# Patient Record
Sex: FEMALE | Race: WHITE | NOT HISPANIC OR LATINO | Employment: FULL TIME | URBAN - METROPOLITAN AREA
[De-identification: names, ages, dates, MRNs, and addresses within clinical notes are randomized per-mention and may not be internally consistent; named-entity substitution may affect disease eponyms.]

---

## 2017-12-07 ENCOUNTER — TRANSCRIBE ORDERS (OUTPATIENT)
Dept: MAMMOGRAPHY | Facility: HOSPITAL | Age: 37
End: 2017-12-07

## 2017-12-07 DIAGNOSIS — N63.23 MASS OF LOWER OUTER QUADRANT OF LEFT BREAST: Primary | ICD-10-CM

## 2017-12-13 ENCOUNTER — TELEPHONE (OUTPATIENT)
Dept: GENETICS | Facility: HOSPITAL | Age: 37
End: 2017-12-13

## 2017-12-21 ENCOUNTER — LAB (OUTPATIENT)
Dept: LAB | Facility: HOSPITAL | Age: 37
End: 2017-12-21

## 2017-12-21 ENCOUNTER — CLINICAL SUPPORT (OUTPATIENT)
Dept: GENETICS | Facility: HOSPITAL | Age: 37
End: 2017-12-21

## 2017-12-21 DIAGNOSIS — Z80.3 FAMILY HISTORY OF BREAST CANCER: Primary | ICD-10-CM

## 2017-12-21 DIAGNOSIS — Z80.42 FAMILY HISTORY OF PROSTATE CANCER: ICD-10-CM

## 2017-12-21 DIAGNOSIS — Z13.79 GENETIC TESTING: Primary | ICD-10-CM

## 2017-12-21 DIAGNOSIS — Z80.1 FAMILY HISTORY OF LUNG CANCER: ICD-10-CM

## 2017-12-27 NOTE — PROGRESS NOTES
Brielle Alejandro is a 37-year old female who was seen for genetic counseling due to a family history of breast cancer.  Ms. Alejandro has no personal history of cancer.  Ms. Alejandro was 9 years old at menarche and had her first child at age 28. Her uterus and ovaries remain intact. Ms. Alejandro recently had a breast ultrasound and mammogram due to a left breast mass, and is scheduled for a breast biopsy next week. Ms. Alejandro was interested in discussing her risk for a hereditary cancer syndrome. She opted to pursue comprehensive testing via the BreastNext panel ordered through [employer] which includes 17 genes that are known to increase the risk of breast cancer; genes included on the panel are WILLIS, BARD1, BRCA1, BRCA2, BRIP1, CDH1, CHEK2, MRE11A, MUTYH, NBN, NF1, PALB2, PTEN, RAD50, RAD51C, RAD51D, and TP53. Results are expected in 2-3 weeks.     PERTINENT FAMILY HISTORY: (See attached pedigree)   Mother:  Breast cancer, 35  Mat. Grandmother: Breast cancer, 60s  Mat. Grandfather: Prostate cancer, 77  Mat. Uncle:  Lung cancer  Mat. Great Aunt: Breast cancer    We do not have medical records regarding the cancer diagnoses in Ms. Alejandro’s family.     RISK ASSESSMENT:  Ms. Alejandro’s family history of breast cancer raises the question of a hereditary breast cancer syndrome. She meets NCCN guidelines criteria for BRCA1/2 testing based on her family history. In cases where an affected relative is not available for testing or not willing to pursue testing, it is appropriate to offer testing to an unaffected individual. If genetic testing is negative, management should be guided by a family history risk assessment. These risk assessments are based on the family history information provided at the time of the appointment.  The assessments could change in the future should new information be obtained.    GENETIC COUNSELING (30 minutes): We reviewed the family history information in detail.  Cases of cancer follow three general  patterns: sporadic, familial, and hereditary.  While most breast cancer is sporadic, some cases appear to occur in family clusters.  These cases are said to be familial and account for 10-20% of breast cancer cases.  Familial cases may be due to a combination of shared genes and environmental factors among family members.  In even fewer families, the cancer is said to be inherited, and the genes responsible for the cancer are known.      Family histories typical of hereditary cancer syndromes usually include multiple first- and second-degree relatives diagnosed with cancer types that define a syndrome.  These cases tend to be diagnosed at younger-than-expected ages and can be bilateral or multifocal.  The cancer in these families follows an autosomal dominant inheritance pattern, which indicates the likely presence of a mutation in a cancer susceptibility gene.  Children and siblings of an individual believed to carry this mutation have a 50% chance of inheriting that mutation, thereby inheriting the increased risk to develop cancer.  These mutations can be passed down from the maternal or the paternal lineage.    Based on Ms. Alejandro’s family history, we discussed hereditary breast and ovarian cancer syndrome.  A significant proportion of hereditary breast and ovarian cancer can be attributed to mutations in the BRCA1 and BRCA2 genes.  Mutations in these genes confer an increased risk for breast cancer, ovarian cancer, male breast cancer, prostate cancer, and pancreatic cancer.  Women with a BRCA1 or BRCA2 mutation have up to an 87% lifetime risk of breast cancer and up to a 44% risk of ovarian cancer.      There are other, more rare, hereditary cancer syndromes. Some of these conditions have well defined cancer risks and established management guidelines.  Other genes that can be tested for have been more recently described, and there may be less data regarding the risks and therefore may not have established  management guidelines.  We discussed these limitations at length.  Based on Ms. Alejandro’s family history and her desire to get the most information regarding her personal risks and potential risks for family members, she opted to pursue testing through a panel evaluating several other genes known to increase the risk for breast cancer.    Genetic Testing:  The risks, benefits and limitations of genetic testing and implications for clinical management following testing were reviewed.  DNA test results can influence decisions regarding screening, prevention and surgical management.  Genetic testing can have significant psychological implications for both individuals and families.  Also discussed was the possibility of employment and insurance discrimination based on genetic test results and the laws in place to prevent this.    We discussed panel testing, which would involve testing for BRCA1/2 as well as several other genes (other genes include WILLIS, BARD1, BRIP1, CDH1, CHEK2, NBN, NF1, MRE11A, MUTYH, PALB2, PTEN, RAD50, RAD51C, RAD51D, TP53). The benefits and limitations of genetic testing were discussed and Ms. Alejandro decided to pursue testing via the panel. The implications of a positive or negative test result were discussed. We discussed the importance of testing on an affected relative. In general, a negative genetic test result is most informative if a mutation has first been established in an affected member of the family.  In cases where an affected individual is not available or interested in testing, it is appropriate to offer testing to an unaffected individual. We discussed the possibility that, in some cases, genetic test results may be informative or may be ambiguous due to the identification of a genetic variant. These variants may or may not be associated with an increased cancer risk.  With multigene panel testing, it is not uncommon for a variant of uncertain significance (VUS) to be identified.  If a  VUS is identified, testing family members is typically not recommended and screening recommendations are made based on the family history.  The laboratories that perform genetic testing work to reclassify the VUS and send out an amended report if and when a VUS is reclassified.  The majority of variant findings are ultimately reclassified to a negative result. We also discussed that some of the genes on this particular panel have not been well studied yet and there may not be clear implications or guidelines for some of the genes included on this comprehensive panel.    PLAN: Ms. Alejandro will be contacted by telephone to discuss the results, which should be available in 2-3 weeks. In the meantime, Ms. Alejandro is welcome to contact us at [phone] with any questions she may have.      Vanessa Steiner MS  Genetic Counselor

## 2017-12-28 ENCOUNTER — TRANSCRIBE ORDERS (OUTPATIENT)
Dept: MAMMOGRAPHY | Facility: HOSPITAL | Age: 37
End: 2017-12-28

## 2017-12-28 ENCOUNTER — HOSPITAL ENCOUNTER (OUTPATIENT)
Dept: MAMMOGRAPHY | Facility: HOSPITAL | Age: 37
Discharge: HOME | End: 2017-12-28
Attending: SURGERY | Admitting: SURGERY

## 2017-12-28 ENCOUNTER — HOSPITAL ENCOUNTER (OUTPATIENT)
Dept: ULTRASOUND IMAGING | Facility: HOSPITAL | Age: 37
Discharge: HOME | End: 2017-12-28

## 2017-12-28 DIAGNOSIS — R92.8 ABNORMAL MAMMOGRAM: Primary | ICD-10-CM

## 2017-12-28 DIAGNOSIS — N63.23 MASS OF LOWER OUTER QUADRANT OF LEFT BREAST: ICD-10-CM

## 2017-12-28 PROCEDURE — 76642 ULTRASOUND BREAST LIMITED: CPT | Performed by: RADIOLOGY

## 2017-12-28 PROCEDURE — 77062 BREAST TOMOSYNTHESIS BI: CPT | Performed by: RADIOLOGY

## 2017-12-28 PROCEDURE — G0279 TOMOSYNTHESIS, MAMMO: HCPCS

## 2017-12-28 PROCEDURE — G0204 DX MAMMO INCL CAD BI: HCPCS

## 2017-12-28 PROCEDURE — 77066 DX MAMMO INCL CAD BI: CPT | Performed by: RADIOLOGY

## 2017-12-28 PROCEDURE — 76642 ULTRASOUND BREAST LIMITED: CPT

## 2018-01-10 ENCOUNTER — DOCUMENTATION (OUTPATIENT)
Dept: GENETICS | Facility: HOSPITAL | Age: 38
End: 2018-01-10

## 2018-01-10 NOTE — PROGRESS NOTES
Brielle Alejandro is a 37-year old female who was seen for genetic counseling due to a family history of breast cancer.  Ms. Alejandro has no personal history of cancer.  Ms. Alejandro was 9 years old at menarche and had her first child at age 28. Her uterus and ovaries remain intact. Ms. Alejandro recently had a breast ultrasound and mammogram due to a left breast mass, and is scheduled for a breast biopsy next week. Ms. Alejandro was interested in discussing her risk for a hereditary cancer syndrome. She opted to pursue comprehensive testing via the BreastNext panel ordered through [employer] which includes 17 genes that are known to increase the risk of breast cancer; genes included on the panel are WILLIS, BARD1, BRCA1, BRCA2, BRIP1, CDH1, CHEK2, MRE11A, MUTYH, NBN, NF1, PALB2, PTEN, RAD50, RAD51C, RAD51D, and TP53. Genetic testing was negative by sequencing and rearrangement testing for deleterious mutations in the 17 genes included on the BreastNext panel (see attached results). These normal results were discussed with Ms. Alejandro by telephone on 1/10/18.    PERTINENT FAMILY HISTORY: (See attached pedigree)   Mother:  Breast cancer, 35  Mat. Grandmother: Breast cancer, 60s; Lung cancer  Mat. Grandfather: Prostate cancer, 77  Mat. Uncle:  Lung cancer  Mat. Great Aunt: Breast cancer    We do not have medical records regarding the cancer diagnoses in Ms. Alejandro’s family.     RISK ASSESSMENT:  Ms. Alejandro’s family history of breast cancer raises the question of a hereditary breast cancer syndrome. She meets NCCN guidelines criteria for BRCA1/2 testing based on her family history. In cases where an affected relative is not available for testing or not willing to pursue testing, it is appropriate to offer testing to an unaffected individual. If genetic testing is negative, management should be guided by a family history risk assessment.     At this time, Ms. Alejandro’s lifetime risk of developing breast cancer should be assessed using family  history risk assessment models.  Using these models, Ms. Alejandro’s breast cancer risk is estimated to be up to 28.9% (CHRISTIE).  A risk greater than 20% warrants consideration of increased screening. These risk assessments are based on the family history information provided at the time of the appointment.  The assessments could change in the future should new information be obtained.    GENETIC COUNSELING (30 minutes): We reviewed the family history information in detail.  Cases of cancer follow three general patterns: sporadic, familial, and hereditary.  While most breast cancer is sporadic, some cases appear to occur in family clusters.  These cases are said to be familial and account for 10-20% of breast cancer cases.  Familial cases may be due to a combination of shared genes and environmental factors among family members.  In even fewer families, the cancer is said to be inherited, and the genes responsible for the cancer are known.      Family histories typical of hereditary cancer syndromes usually include multiple first- and second-degree relatives diagnosed with cancer types that define a syndrome.  These cases tend to be diagnosed at younger-than-expected ages and can be bilateral or multifocal.  The cancer in these families follows an autosomal dominant inheritance pattern, which indicates the likely presence of a mutation in a cancer susceptibility gene.  Children and siblings of an individual believed to carry this mutation have a 50% chance of inheriting that mutation, thereby inheriting the increased risk to develop cancer.  These mutations can be passed down from the maternal or the paternal lineage.    Based on Ms. Alejandro’s family history, we discussed hereditary breast and ovarian cancer syndrome.  A significant proportion of hereditary breast and ovarian cancer can be attributed to mutations in the BRCA1 and BRCA2 genes.  Mutations in these genes confer an increased risk for breast cancer, ovarian  cancer, male breast cancer, prostate cancer, and pancreatic cancer.  Women with a BRCA1 or BRCA2 mutation have up to an 87% lifetime risk of breast cancer and up to a 44% risk of ovarian cancer.      There are other, more rare, hereditary cancer syndromes. Some of these conditions have well defined cancer risks and established management guidelines.  Other genes that can be tested for have been more recently described, and there may be less data regarding the risks and therefore may not have established management guidelines.  We discussed these limitations at length.  Based on Ms. Alejandro’s family history and her desire to get the most information regarding her personal risks and potential risks for family members, she opted to pursue testing through a panel evaluating several other genes known to increase the risk for breast cancer.    GENETIC TESTING:  The risks, benefits and limitations of genetic testing and implications for clinical management following testing were reviewed.  DNA test results can influence decisions regarding screening, prevention and surgical management.  Genetic testing can have significant psychological implications for both individuals and families.  Also discussed was the possibility of employment and insurance discrimination based on genetic test results and the laws in place to prevent this.    We discussed panel testing, which would involve testing for BRCA1/2 as well as several other genes (other genes include WILLIS, BARD1, BRIP1, CDH1, CHEK2, NBN, NF1, MRE11A, MUTYH, PALB2, PTEN, RAD50, RAD51C, RAD51D, TP53). The benefits and limitations of genetic testing were discussed and Ms. Alejandro decided to pursue testing via the panel. The implications of a positive or negative test result were discussed. We discussed the importance of testing on an affected relative. In general, a negative genetic test result is most informative if a mutation has first been established in an affected member of  the family.  In cases where an affected individual is not available or interested in testing, it is appropriate to offer testing to an unaffected individual. We discussed the possibility that, in some cases, genetic test results may be informative or may be ambiguous due to the identification of a genetic variant. These variants may or may not be associated with an increased cancer risk.  With multigene panel testing, it is not uncommon for a variant of uncertain significance (VUS) to be identified.  If a VUS is identified, testing family members is typically not recommended and screening recommendations are made based on the family history.  The laboratories that perform genetic testing work to reclassify the VUS and send out an amended report if and when a VUS is reclassified.  The majority of variant findings are ultimately reclassified to a negative result. We also discussed that some of the genes on this particular panel have not been well studied yet and there may not be clear implications or guidelines for some of the genes included on this comprehensive panel.    TEST RESULTS:  Genetic testing was negative for known deleterious mutations by sequencing and rearrangement testing for the genes on the BreastNext panel.   It is possible that the family history is due to a mutation that Ms. Alejandro did not inherit; therefore, genetic testing for affected family members is still available.  If a relative of Ms. Alejandro were to have testing and was found to carry a mutation in a gene included on this panel, Ms. Alejandro’s risk assessment would need to be updated.   This assessment is based on the information provided at the time of the consultation.    CANCER PREVENTION:  Despite the negative genetic test results, Ms. Alejandro’s lifetime risk for breast cancer is estimated to be above 20% based on her family history. Given Ms. Alejandro’s increased risk, options available to individuals with a high lifetime risk for breast cancer  were discussed, including increased surveillance and chemoprevention.    Increased surveillance, based on NCCN guidelines, would consist of semi-annual clinical breast exam and monthly self-breast exam starting by age 18 and annual mammography typically starting ten years prior to the youngest diagnosis in the family, or age 40, whichever is earliest.  According to an [employer] Society expert panel and NCCN guidelines, annual breast MRI should be offered to women whose lifetime risk of breast cancer is 20-25 percent or more and beginning at the same age as mammography.  Breast cancer chemoprevention is another option that Ms. Alejandro may wish to consider in the future.  Studies have shown that Tamoxifen and Raloxifene can cut the risk of estrogen receptor positive breast cancer by 50% when taken by high-risk women over a 5-year period.  There are risks and side effects associated with these medications; therefore, the risks versus benefits must be considered prior to deciding to take chemopreventative medications.      PLAN: Genetic counseling remains available to Ms. Alejandro and her family. We discussed the option of surveillance and management through the [facility] at [facility]. Ms. Alejandro would like to be enrolled in this clinic, and the clinic coordinator will be contacting her to schedule an appointment. If Ms. Alejandro has any questions or concerns, she is welcome to contact us at [phone].      Vanessa Steiner MS  Genetic Counselor    Cc: MD Melia Taylor MD

## 2018-02-02 ENCOUNTER — HOSPITAL ENCOUNTER (OUTPATIENT)
Dept: ULTRASOUND IMAGING | Facility: HOSPITAL | Age: 38
Discharge: HOME | End: 2018-02-02
Admitting: RADIOLOGY

## 2018-02-02 ENCOUNTER — HOSPITAL ENCOUNTER (OUTPATIENT)
Dept: MAMMOGRAPHY | Facility: HOSPITAL | Age: 38
Discharge: HOME | End: 2018-02-02

## 2018-02-02 DIAGNOSIS — R92.8 ABNORMAL MAMMOGRAM: ICD-10-CM

## 2018-02-02 PROCEDURE — 77065 DX MAMMO INCL CAD UNI: CPT | Performed by: RADIOLOGY

## 2018-02-02 PROCEDURE — 19083 BX BREAST 1ST LESION US IMAG: CPT | Performed by: RADIOLOGY

## 2018-02-02 PROCEDURE — A4648 IMPLANTABLE TISSUE MARKER: HCPCS

## 2018-02-02 PROCEDURE — 88305 TISSUE EXAM BY PATHOLOGIST: CPT | Performed by: SURGERY

## 2018-02-02 RX ORDER — LIDOCAINE HYDROCHLORIDE AND EPINEPHRINE 10; 10 MG/ML; UG/ML
10 INJECTION, SOLUTION INFILTRATION; PERINEURAL ONCE
Status: SHIPPED | OUTPATIENT
Start: 2018-02-02

## 2018-02-02 RX ORDER — LIDOCAINE HYDROCHLORIDE 10 MG/ML
5 INJECTION, SOLUTION INFILTRATION; PERINEURAL ONCE
Status: COMPLETED | OUTPATIENT
Start: 2018-02-02 | End: 2018-02-02

## 2018-02-02 RX ADMIN — LIDOCAINE HYDROCHLORIDE 1 ML: 10 INJECTION, SOLUTION INFILTRATION; PERINEURAL at 15:42

## 2018-02-05 LAB
CYTO UR: NORMAL
LAB AP CASE REPORT: NORMAL
LAB AP CLINICAL INFORMATION: NORMAL
LAB AP DIAGNOSIS COMMENT: NORMAL
Lab: NORMAL
PATH REPORT.FINAL DX SPEC: NORMAL
PATH REPORT.GROSS SPEC: NORMAL

## 2018-02-08 ENCOUNTER — TELEPHONE (OUTPATIENT)
Dept: MAMMOGRAPHY | Facility: HOSPITAL | Age: 38
End: 2018-02-08

## 2018-02-08 NOTE — TELEPHONE ENCOUNTER
02.08.18 @ 1455: Pt notified of pathology results and recommendations. Verbalizes understanding. Denies discomfort. Denies any signs and symptoms of infection.

## 2021-06-07 ENCOUNTER — TRANSCRIBE ORDERS (OUTPATIENT)
Dept: ADMINISTRATIVE | Facility: HOSPITAL | Age: 41
End: 2021-06-07

## 2021-06-07 DIAGNOSIS — Z12.31 VISIT FOR SCREENING MAMMOGRAM: Primary | ICD-10-CM

## 2021-06-10 ENCOUNTER — HOSPITAL ENCOUNTER (OUTPATIENT)
Dept: MAMMOGRAPHY | Facility: HOSPITAL | Age: 41
Discharge: HOME | End: 2021-06-10
Admitting: FAMILY MEDICINE

## 2021-06-10 DIAGNOSIS — Z12.31 VISIT FOR SCREENING MAMMOGRAM: ICD-10-CM

## 2021-06-10 PROCEDURE — 77063 BREAST TOMOSYNTHESIS BI: CPT | Performed by: RADIOLOGY

## 2021-06-10 PROCEDURE — 77067 SCR MAMMO BI INCL CAD: CPT | Performed by: RADIOLOGY

## 2021-06-10 PROCEDURE — 77063 BREAST TOMOSYNTHESIS BI: CPT

## 2021-06-10 PROCEDURE — 77067 SCR MAMMO BI INCL CAD: CPT

## 2023-11-14 ENCOUNTER — TRANSCRIBE ORDERS (OUTPATIENT)
Dept: ADMINISTRATIVE | Facility: HOSPITAL | Age: 43
End: 2023-11-14
Payer: COMMERCIAL

## 2023-11-14 DIAGNOSIS — Z12.31 VISIT FOR SCREENING MAMMOGRAM: Primary | ICD-10-CM

## 2023-11-17 ENCOUNTER — TRANSCRIBE ORDERS (OUTPATIENT)
Dept: ADMINISTRATIVE | Facility: HOSPITAL | Age: 43
End: 2023-11-17
Payer: COMMERCIAL

## 2023-11-17 DIAGNOSIS — N63.14 MASS OF LOWER INNER QUADRANT OF RIGHT BREAST: Primary | ICD-10-CM

## 2023-12-28 ENCOUNTER — HOSPITAL ENCOUNTER (OUTPATIENT)
Dept: ULTRASOUND IMAGING | Facility: HOSPITAL | Age: 43
Discharge: HOME | End: 2023-12-28
Payer: COMMERCIAL

## 2023-12-28 ENCOUNTER — HOSPITAL ENCOUNTER (OUTPATIENT)
Dept: MAMMOGRAPHY | Facility: HOSPITAL | Age: 43
Discharge: HOME | End: 2023-12-28
Payer: COMMERCIAL

## 2023-12-28 ENCOUNTER — TRANSCRIBE ORDERS (OUTPATIENT)
Dept: ADMINISTRATIVE | Facility: HOSPITAL | Age: 43
End: 2023-12-28
Payer: COMMERCIAL

## 2023-12-28 DIAGNOSIS — N63.14 MASS OF LOWER INNER QUADRANT OF RIGHT BREAST: ICD-10-CM

## 2023-12-28 DIAGNOSIS — R92.8 ABNORMAL MAMMOGRAM: Primary | ICD-10-CM

## 2023-12-28 PROCEDURE — 77066 DX MAMMO INCL CAD BI: CPT

## 2023-12-28 PROCEDURE — G0279 TOMOSYNTHESIS, MAMMO: HCPCS

## 2023-12-28 PROCEDURE — 76642 ULTRASOUND BREAST LIMITED: CPT

## 2024-01-25 ENCOUNTER — HOSPITAL ENCOUNTER (OUTPATIENT)
Dept: ULTRASOUND IMAGING | Facility: HOSPITAL | Age: 44
Discharge: HOME | End: 2024-01-25
Payer: COMMERCIAL

## 2024-01-25 ENCOUNTER — HOSPITAL ENCOUNTER (OUTPATIENT)
Dept: MAMMOGRAPHY | Facility: HOSPITAL | Age: 44
Discharge: HOME | End: 2024-01-25
Payer: COMMERCIAL

## 2024-01-25 DIAGNOSIS — R92.8 ABNORMAL MAMMOGRAM: ICD-10-CM

## 2024-01-25 PROCEDURE — 88305 TISSUE EXAM BY PATHOLOGIST: CPT | Performed by: RADIOLOGY

## 2024-01-25 PROCEDURE — 25010000002 LIDOCAINE 1 % SOLUTION: Performed by: RADIOLOGY

## 2024-01-25 PROCEDURE — A4648 IMPLANTABLE TISSUE MARKER: HCPCS

## 2024-01-25 RX ORDER — LIDOCAINE HYDROCHLORIDE AND EPINEPHRINE 10; 10 MG/ML; UG/ML
10 INJECTION, SOLUTION INFILTRATION; PERINEURAL ONCE
Status: COMPLETED | OUTPATIENT
Start: 2024-01-25 | End: 2024-01-25

## 2024-01-25 RX ORDER — LIDOCAINE HYDROCHLORIDE 10 MG/ML
5 INJECTION, SOLUTION INFILTRATION; PERINEURAL ONCE
Status: COMPLETED | OUTPATIENT
Start: 2024-01-25 | End: 2024-01-25

## 2024-01-25 RX ADMIN — Medication 5 ML: at 11:35

## 2024-01-25 RX ADMIN — LIDOCAINE HYDROCHLORIDE,EPINEPHRINE BITARTRATE 3 ML: 10; .01 INJECTION, SOLUTION INFILTRATION; PERINEURAL at 11:37

## 2024-01-26 LAB
CYTO UR: NORMAL
LAB AP CASE REPORT: NORMAL
LAB AP CLINICAL INFORMATION: NORMAL
LAB AP DIAGNOSIS COMMENT: NORMAL
PATH REPORT.FINAL DX SPEC: NORMAL
PATH REPORT.GROSS SPEC: NORMAL

## 2024-01-29 ENCOUNTER — TELEPHONE (OUTPATIENT)
Dept: MAMMOGRAPHY | Facility: HOSPITAL | Age: 44
End: 2024-01-29
Payer: COMMERCIAL

## 2024-02-05 ENCOUNTER — TELEPHONE (OUTPATIENT)
Dept: MAMMOGRAPHY | Facility: HOSPITAL | Age: 44
End: 2024-02-05
Payer: COMMERCIAL

## 2024-02-05 NOTE — TELEPHONE ENCOUNTER
Attempted to call patient to follow-up regarding surgical consult decision.  A message was left on her voicemail to return my call.

## 2024-02-08 ENCOUNTER — TELEPHONE (OUTPATIENT)
Dept: MAMMOGRAPHY | Facility: HOSPITAL | Age: 44
End: 2024-02-08
Payer: COMMERCIAL

## 2024-02-13 ENCOUNTER — TELEPHONE (OUTPATIENT)
Dept: MAMMOGRAPHY | Facility: HOSPITAL | Age: 44
End: 2024-02-13
Payer: COMMERCIAL